# Patient Record
Sex: FEMALE | Employment: FULL TIME | URBAN - NONMETROPOLITAN AREA
[De-identification: names, ages, dates, MRNs, and addresses within clinical notes are randomized per-mention and may not be internally consistent; named-entity substitution may affect disease eponyms.]

---

## 2023-02-13 ENCOUNTER — OFFICE VISIT (OUTPATIENT)
Dept: FAMILY MEDICINE CLINIC | Age: 53
End: 2023-02-13

## 2023-02-13 VITALS
HEART RATE: 78 BPM | HEIGHT: 62 IN | OXYGEN SATURATION: 97 % | SYSTOLIC BLOOD PRESSURE: 122 MMHG | DIASTOLIC BLOOD PRESSURE: 70 MMHG | BODY MASS INDEX: 27.46 KG/M2 | TEMPERATURE: 97.4 F | RESPIRATION RATE: 10 BRPM | WEIGHT: 149.2 LBS

## 2023-02-13 DIAGNOSIS — R74.01 ALT (SGPT) LEVEL RAISED: ICD-10-CM

## 2023-02-13 DIAGNOSIS — R41.3 MEMORY DIFFICULTIES: ICD-10-CM

## 2023-02-13 DIAGNOSIS — E55.9 VITAMIN D DEFICIENCY, UNSPECIFIED: ICD-10-CM

## 2023-02-13 DIAGNOSIS — R35.1 NOCTURIA: ICD-10-CM

## 2023-02-13 DIAGNOSIS — E78.5 ELEVATED LIPIDS: ICD-10-CM

## 2023-02-13 DIAGNOSIS — Z71.89 ENCOUNTER FOR HERB AND VITAMIN SUPPLEMENT MANAGEMENT: Primary | ICD-10-CM

## 2023-02-13 DIAGNOSIS — E06.3 HASHIMOTO'S THYROIDITIS: ICD-10-CM

## 2023-02-13 DIAGNOSIS — M54.2 NECK PAIN: ICD-10-CM

## 2023-02-13 DIAGNOSIS — R74.8 LOW SERUM HDL: ICD-10-CM

## 2023-02-13 DIAGNOSIS — G47.9 SLEEP DIFFICULTIES: ICD-10-CM

## 2023-02-13 DIAGNOSIS — K59.01 SLOW TRANSIT CONSTIPATION: ICD-10-CM

## 2023-02-13 RX ORDER — MELOXICAM 15 MG/1
TABLET ORAL DAILY PRN
COMMUNITY
Start: 2022-12-03

## 2023-02-13 RX ORDER — LEVOTHYROXINE SODIUM 0.03 MG/1
TABLET ORAL
COMMUNITY
Start: 2022-07-07

## 2023-02-13 RX ORDER — ESCITALOPRAM OXALATE 10 MG/1
TABLET ORAL
COMMUNITY
Start: 2022-09-26

## 2023-02-13 SDOH — ECONOMIC STABILITY: FOOD INSECURITY: WITHIN THE PAST 12 MONTHS, YOU WORRIED THAT YOUR FOOD WOULD RUN OUT BEFORE YOU GOT MONEY TO BUY MORE.: NEVER TRUE

## 2023-02-13 SDOH — ECONOMIC STABILITY: INCOME INSECURITY: HOW HARD IS IT FOR YOU TO PAY FOR THE VERY BASICS LIKE FOOD, HOUSING, MEDICAL CARE, AND HEATING?: NOT HARD AT ALL

## 2023-02-13 SDOH — ECONOMIC STABILITY: FOOD INSECURITY: WITHIN THE PAST 12 MONTHS, THE FOOD YOU BOUGHT JUST DIDN'T LAST AND YOU DIDN'T HAVE MONEY TO GET MORE.: NEVER TRUE

## 2023-02-13 SDOH — ECONOMIC STABILITY: HOUSING INSECURITY
IN THE LAST 12 MONTHS, WAS THERE A TIME WHEN YOU DID NOT HAVE A STEADY PLACE TO SLEEP OR SLEPT IN A SHELTER (INCLUDING NOW)?: NO

## 2023-02-13 ASSESSMENT — PATIENT HEALTH QUESTIONNAIRE - PHQ9
SUM OF ALL RESPONSES TO PHQ QUESTIONS 1-9: 0
SUM OF ALL RESPONSES TO PHQ9 QUESTIONS 1 & 2: 0
SUM OF ALL RESPONSES TO PHQ QUESTIONS 1-9: 0
SUM OF ALL RESPONSES TO PHQ QUESTIONS 1-9: 0
2. FEELING DOWN, DEPRESSED OR HOPELESS: 0
SUM OF ALL RESPONSES TO PHQ QUESTIONS 1-9: 0
1. LITTLE INTEREST OR PLEASURE IN DOING THINGS: 0

## 2023-02-13 NOTE — PATIENT INSTRUCTIONS
Thank you   Thank you for trusting us with your healthcare needs. You may receive a survey regarding today's visit. It would help us out if you would take a few moments to provide your feedback. We value your input. Please bring in ALL medications BOTTLES, including inhalers, herbal supplements, over the counter, prescribed & non-prescribed medicine. The office would like actual medication bottles and a list.   Please note our OFFICE POLICIES:   Prior to getting your labs drawn, please check with your insurance company for benefits and eligibility of lab services. Often, insurance companies cover certain tests for preventative visits only. It is patient's responsibility to see what is covered. We are unable to change a diagnosis after the test has been performed. Lab orders will not be re-printed. Please hold onto your original lab orders and take them to your lab to be completed. If you no show your scheduled appointment three times, you will be dismissed from this practice. Reschedules must be completed 24 hours prior to your schedule appointment. If the list below has been completed, PLEASE FAX RECORDS TO OUR OFFICE @ [phone].  Once the records have been received we will update your records at our office:  Health Maintenance Due   Topic Date Due    Depression Screen  Never done    Cervical cancer screen  Never done    Colorectal Cancer Screen  Never done    Breast cancer screen  Never done    COVID-19 Vaccine (4 - Booster for Moderna series) 04/06/2022    Lipids  09/18/2022

## 2023-02-13 NOTE — PROGRESS NOTES
[address] [facility], [place] [address] [address]  Dept: [phone]  Dept Fax: [phone]  Loc: [phone]      Óscar Godoy is a 46 y.o. [place] [facility] female. Laura Marie  presents to the [place] [address] clinic today for   Chief Complaint   Patient presents with    New Patient     Wee protocol    Fatigue     Not sleeping well occasional at night    Other     Bilat feet pain, right side of neck and shoulder- occasional pain down to finger tips    Discuss Labs    Sleep Problem     Change 3 years ago, on thyroid    Foot Pain     Up the leg    Anxiety     Feelings changed before thyroid, dont want to go to social events,     Neck Pain     And shoulder, right upper CC joints,     Knee Pain     Right side feet knee, walks on toes   , and;   1. Encounter for herb and vitamin supplement management    2. ALT (SGPT) level raised    3. Hashimoto's thyroiditis    4. Elevated lipids    5. Vitamin D deficiency, unspecified    6. Low serum HDL    7. Neck pain    8. Sleep difficulties    9. Slow transit constipation    10. Nocturia    11. Memory difficulties          I have reviewed [facility] medical, surgical and other pertinent history in detail, and have updated medication and allergy information in the computerized patient record. Clinical Care Team:     -Referring Provider for today's consult: self  -Primary Care Provider: No primary care provider on file. Medical/Surgical History:   She  has no past medical history on file. Her  has no past surgical history on file. Family/Social History:     Her family history is not on file. She  reports that she has never smoked. She has never used smokeless tobacco. She reports that she does not drink alcohol and does not use drugs.     Medications/Allergies/Immunizations:     Her current medication(s) include   Current Outpatient Medications:     meloxicam (MOBIC) 15 MG tablet, daily as needed, Disp: , Rfl:     levothyroxine (SYNTHROID) 25 MCG tablet, Take by mouth, Disp: , Rfl:     escitalopram (LEXAPRO) 10 MG tablet, Take by mouth, Disp: , Rfl:   Allergies: Patient has no known allergies. Immunizations:   Immunization History   Administered Date(s) Administered    COVID-19, MODERNA BLUE border, Primary or Immunocompromised, (age 12y+), IM, 100 mcg/0.5mL 03/18/2021, 04/15/2021    COVID-19, MODERNA Booster BLUE border, (age 18y+), IM, 50mcg/0.25mL 02/09/2022    Influenza Virus Vaccine 09/24/2015    Influenza, AFLURIA (age 1 yrs+), FLUZONE, (age 10 mo+), MDV, 0.5mL 09/24/2015, 09/18/2017    Influenza, FLUARIX, FLULAVAL, FLUZONE (age 10 mo+) AND AFLURIA, (age 1 y+), PF, 0.5mL 10/03/2016, 09/26/2022    Pneumococcal Conjugate 13-valent ([id number]) 08/18/2021    Tdap (Boostrix, Adacel) 07/20/2018    Zoster Recombinant (Shingrix) 08/18/2021, 03/30/2022        History of Present Illness:     Clementdarvinsung's had concerns including New Patient (Wee protocol), Fatigue (Not sleeping well occasional at night), Other (Bilat feet pain, right side of neck and shoulder- occasional pain down to finger tips), Discuss Labs, Sleep Problem (Change 3 years ago, on thyroid), Foot Pain (Up the leg), Anxiety (Feelings changed before thyroid, dont want to go to social events, ), Neck Pain (And shoulder, right upper CC joints, ), and Knee Pain (Right side feet knee, walks on toes). Brittanie Bland  presents to the [address] [place] [address] today for;   Chief Complaint   Patient presents with    New Patient     Wee protocol    Fatigue     Not sleeping well occasional at night    Other     Bilat feet pain, right side of neck and shoulder- occasional pain down to finger tips    Discuss Labs    Sleep Problem     Change 3 years ago, on thyroid    Foot Pain     Up the leg    Anxiety     Feelings changed before thyroid, dont want to go to social events,     Neck Pain     And shoulder, right upper CC joints,     Knee Pain     Right side feet knee, walks on toes   , abnormal labs follow up and these conditions as she  Is looking today for:     1. Encounter for herb and vitamin supplement management    2. ALT (SGPT) level raised    3. Hashimoto's thyroiditis    4. Elevated lipids    5. Vitamin D deficiency, unspecified    6. Low serum HDL    7. Neck pain    8. Sleep difficulties    9. Slow transit constipation    10. Nocturia    11. Memory difficulties      HPI    Subjective:     Review of Systems   Musculoskeletal:  Positive for arthralgias, gait problem, joint swelling and neck pain. Psychiatric/Behavioral:  Positive for sleep disturbance. All other systems reviewed and are negative. Objective:     /70 (Site: Left Upper Arm, Position: Sitting, Cuff Size: Medium Adult)   Pulse 78   Temp 97.4 °F (36.3 °C) (Temporal)   Resp 10   Ht 5' 1.75\" (1.568 m)   Wt 149 lb 3.2 oz (67.7 kg)   SpO2 97%   BMI 27.51 kg/m²   Physical Exam  Vitals and nursing note reviewed. Constitutional:       Appearance: Normal appearance. HENT:      Head: Normocephalic. Pulmonary:      Effort: Pulmonary effort is normal.   Neurological:      Mental Status: She is alert. Psychiatric:         Mood and Affect: Mood normal.         Thought Content: Thought content normal.          Laboratory Data:   Lab results were searched in Care Everywhere and/or those brought by the pateint were reviewed today with Fredy Dinh and she has a copy of their most recent labs to take home with them as noted below;       Imaging Data:   Imaging Data:       Assessment & Plan:       Impression:  1. Encounter for herb and vitamin supplement management    2. ALT (SGPT) level raised    3. Hashimoto's thyroiditis    4. Elevated lipids    5. Vitamin D deficiency, unspecified    6. Low serum HDL    7. Neck pain    8. Sleep difficulties    9. Slow transit constipation    10. Nocturia    11.  Memory difficulties      Assessment and Plan:  After reviewing the patients chief complaints, reviewing their labfindings in great detail (with the patient and those accompanying them) which correlate to their chief complaints, symptoms, and or medical conditions; suggestions were made relating to changes in diet and or supplements which may improve the complaints and which will be reflected in their future lab findings; Chief Complaint   Patient presents with    New Patient     Wee protocol    Fatigue     Not sleeping well occasional at night    Other     Bilat feet pain, right side of neck and shoulder- occasional pain down to finger tips    Discuss Labs    Sleep Problem     Change 3 years ago, on thyroid    Foot Pain     Up the leg    Anxiety     Feelings changed before thyroid, dont want to go to social events,     Neck Pain     And shoulder, right upper CC joints,     Knee Pain     Right side feet knee, walks on toes   ;    Plans for the next visits:  - Abnormal and non-optimal Labs were ordered today to be repeated in the next 120-365 days to assess changes from adjustments in nutrition and or nutrients. - Patient instructed when having a blood draw to ask the  to divide their lab draws into multiple draws over several days if not feeling good at the time of the lab draw or if either prefers to do several smaller blood draws over several days  -Patient instructed to check with insurer before each lab draw and to go to the lab which the insurer directs them for the most cost effective lab draw with the least patient's cost  - Sixto De León  will be scheduled subsequent to those results. - Sixto De León will bring in her drink, food, supplement log to her next visit    Chronic Problems Addressed on this Visit:                                   1.  Intensity of Service; Uncontrolled items at this visit;    Chief Complaint   Patient presents with    New Patient     Wee protocol    Fatigue     Not sleeping well occasional at night    Other     Bilat feet pain, right side of neck and shoulder- occasional pain down to finger tips    Discuss Labs    Sleep Problem     Change 3 years ago, on thyroid    Foot Pain     Up the leg    Anxiety     Feelings changed before thyroid, dont want to go to social events,     Neck Pain     And shoulder, right upper CC joints,     Knee Pain     Right side feet knee, walks on toes   ; Improved items at this visit and Stable items were discussed at this visit;  2. Patients food, drinks, supplements and symptoms were reviewed with the patient,       - Lisa Torres will bring food, drink, supplements and symptoms log to next visit for inclusion in their record      - 75 better food list reviewed & given to patient with the omega 6 food list to avoid      - The 52 Latex foods list was reviewed and given to the patients with the information on carrageenan         - Gluten in corn and oats abstracts sheet reviewed and given to the patient today   3. Greater than 60 minutes time was spent with the patient face to face on this visit; of which >50% was for counseling and coordination of care, as well as the time spent before and after the visit reviewing the chart, documenting the encounter, reviewing labs,reports, NIH listed studies, making phone calls, etc.      Patients food and drinks were reviewed with the patient,   - They will bring a food drink symptom log to future visits for inclusion in their record    - 75 better food list reviewed & given to patient along with the omega 6 food list to avoid      - Gluten in corn and oats abstracts sheet reviewed and given to the patient today    - 23 Foods containing Latex-like proteins was reviewed and copy to be taken if desired     - Nutrient Supplements list provided and copyto be taken if desired    - [employer] web site offered to patient to review at their convenience by staff with login information    Note:  I have discussed with the patient that with all nutraceuticals, there is often mixed data and emerging research which needs to be monitored; as well as an array of NIH fact sheets on nutrients and supplements, available at www.nih,issue plus [employer]. Healthcare Bluebook plus www.Qustreeti,org. If I have recommended cinnamon at the request of this patient to assist them in control of their blood sugar, triglyceride, and/or weight issues. I discussed that the patient's clinical use of cinnamon bark, calcium, magnesium, Vitamin D, and pharmaceutical grade CVS omega 3 oil or triple-strength fish oil, and B-50/B-100 time-released B-complex by [id number] [facility] will be for a time-limited trial to determine their individual effectiveness and safety in this patient. I also referred the patient to the NMCD: Nutrition, Metabolism, and Cardiovascular Diseases (SecuritiesCard.pl) and concerns about long-term use and hepatotoxicity of cinnamon and other nutrients. I suggested they frequently search nih.gov for the latest non-proprietary information on nutriceuticals as well as consider a subscription to [employer] for details on reviewed supplements, or at the least review the nutrient files at [facility] at [facility], [address] bark, an insulin mimetic, reduces some High Carbohydrate Dietary Impacts. Methylhydroxychalcone polymers insulin-enhancing properties in fat cells are responsible for enhanced glucose uptake, inhibiting hepatic HMG-CoA reductase and lowers lipids. www.jacn. org/content/20/4/327.full     But cinnamon with additives such as Cinnamon Extract are not effective as insulin mimetics.  :eStoreDirectory.at     Nutrients for Start up from [employer] or [employer] for ease to get started now;  Abdullahi Fowler has some useable products;  - Triple Strength Fish Oil, enteric coated  - Vit D-3 5000 IU gel caps  - Iron ferrous sulfate 325 mg tabs  - Centrum Silver look-a-like for most patients, or  - Centrum plain look-a-like if need iron    Local pharmacies or chains such as [employer], [employer], have:  - CVS pharmaceutical grade omega 3 is 90% EPA/DHA whereas most Triple strength fish oil are 75% EPA/DHA  - Triple Strength Fish Oil (enteric coated if available) or if not enteric coated, can take from freezer for less burps  - B-50 or B-100 released balanced B complex tabs by [id number] [facility] bark 500 mg (without Chromium or extracts)   some brands list 1000 mg / serving of 2 capsules,    some brands have 1000 mg caps with the undesireable chromium extract  - Calcium carbonate/citrate, magnesium oxide/citrate, Vit D-3 as 3-4 tabs/caps/serving     Some Local Brands may contain Zinc which is acceptable for the first bottle or two  - Magnesium oxide 250 mg tabs for those having < 2 bowel movements daily  - Magnesium citrate 200 mg if having > 2 bowel movements/day  - Centrum Silver or look-a-like for most patients, Centrum plain or look-a-like with iron  - Vitamin D-3 comes as 1,000 IU or 2,000 IU or 5,000 IU gel caps or Liquid drops but keep Vitamin D levels <50 but >40     Some brands containing or derived from soy oil or corn oil are OK if not allergic to soy  - Elemental Iron 65 mg tabs at bedtime is available over the counter if need more iron     Usually turns bowel movements grey, green, or black but not a concern  - Apricot Kernel Oil (by Now) for dry skin sensitive perineal or perianal area skin    Nutrients for ongoing use by Mail order for less expense from Anywhere[employer]. [employer] ;  - Strength Fish Oil , 240 Softgels Item [id number]  -B-100 time released balanced B complex Item #[id number]  - Cinnamon bark 500 mg without Chromium or extract Item #[id number]  - Calcium carbonate 1000 mg, Magnesium oxide 500 mg, Vit D-3 400 IU Item #[id number]  - Magnesium oxide 500 mg tabs Item #[id number] if less than 2 bowel movements daily  - [employer] Seniors Item #[id number] for most patients, One Daily Item #603956 with iron  - Vit D 3  1,000 Item #[id number]      2,000 IU Item #[id number]   Item #[id number]     Some brands containing orderived from soy oil or corn oil are OK if not allergic to soy    Nutrients for Special Needs by Mail order for less expense from www. puritan.com;  -Elemental Iron 65 mg tabs Item #095892 if need more iron for low iron on labs    Usually turns bowel movements grey, green or black but not a concern  - Time released Niacin 250 mg Item #175517 for cold intolerance, low libido or impotence  - DHEA 50 mg Item #[id number] for improving DHEA levels on labs if having Fatigue    If stools too loose substitute for your Magnesium oxide using;   Magnesium citrate 200 mg tabs (NOT liquid) at [employer]   Magnesium gluconate 550 mg by Daniel Lau at Preo or Kähu. com  Magnesium chloride foot soaks or body sprays  www.Slicebooks   Magnesium chloride flakes 14.99 Item #: [id number] if back-ordered, get spray  Magnesium threonate, Magtein also helps mental clarity and sleep    Food Drink Symptom Log;  I asked this patient to track these items and any other symptoms on their list on a weekly basis to documenttheir progress or lack of same. This can be done on the symptom tracking sheet I gave them at today's visit but looks like this:                                                      Rate on scale of 0-10 with zero = not noticeable  Symptom:                            Week 1               2                 3                 4               Etc            Hair loss    Foot cramps    Paresthesia    Aches    IBS (irritable bowel)    Constipation    Diarrhea  Nocturia (up to bathroom at night)    Fatigue/Energy level  Stress      On the other side of the sheet they can track their food, drink, environment, activity, symptoms etc      Avoiding Latex-like proteins in my foods;     Avocados, Bananas, Celery, Figs & Kiwi proteins have latex-like proteins to inflame our immune systems, plus 47 more foods  How Can I Have A Latex Allergy? Eating foods with latex-like protein exposes us to latex allergies. Our body cannot tell the differencebetween these latex-like proteins and latex from rubber products since many people are allergic to fruit, vegetables and latex. Read labels on pre-packaged foods. This list to avoid is only a guide if you are known allergicto latex or have a latex rash on your chin, cheeks and lines on your neck and chest. The amount of latex is different in each food product or fruit variety. Avoid out of Season if not grown locally:   Melon, Nectarine, Papaya, Cherry, Passion fruit, Plum, Chestnuts, and Tomato. Avocado, Banana, Celery, Figs, and Kiwi always contain Latex-like protein. Whats in Season? Strawberries taste better in June than December because June is strawberry season so buy locally grown produce \"in season\" for the best flavor, cost, and less Latex. Locally grown produce not only tastes great but also requires little or no ethylene exposure in food distribution so has less latex content. Out of season: use canned, frozen, or dried since those are processed ripe and latex content is lower!!!     Month     [place] Locally Grown Produce  January, February, March: use canned, frozen or dried fruits since lower in latex  April: asparagus, radishes  May: asparagus, broccoli, green onions, greens, peas, radishes, rhubarb  Nathalia: asparagus, beets, beans, broccoli, cabbage, cantaloupe, carrots, green onions, greens, lettuce, onions, parsley, peas, radishes, rhubarb, strawberries, watermelons  July: beans, beets, blueberries, broccoli, cabbage, cantaloupe, carrots, cauliflower, celery, cucumbers, eggplant, grapes, green onions, greens, lettuce, onions, parsley, peas, peaches, bell peppers, potatoes, radishes, summer raspberries, squash, sweetcorn, tomatoes, turnips, watermelons  August: apples, beans, beets, blueberries, cabbage, cantaloupe, carrots, cauliflower, celery, cucumbers, eggplant, grapes, green onions, greens, lettuce, onions, parsley, peas, peaches, pears, bell peppers, potatoes, radishes, squash, sweet corn, tomatoes, turnips, watermelons  September: apples, beans, beets, blueberries, cabbage, cantaloupe, carrots, cauliflower, celery, cucumbers, eggplant, grapes, green onions, greens, lettuce, onions, parsley, peas, peaches, pears, bell peppers, plums, potatoes, pumpkins, radishes, fall red raspberries, squash, sweet corn, tomatoes, turnips, watermelons  October: apples, beets, broccoli, cabbage, carrots, cauliflower, celery, green onions, greens, lettuce, parsley, peas, pears, potatoes, pumpkins, radishes, fall red raspberries, squash, turnips  November: broccoli, cabbage, carrots, parsley, pears, peas  December: use canned, frozen or dried fruits since lower in latex    Upto half of latex-sensitive patients show allergic reactions to fruits (avocados, bananas, kiwifruits, papayas, peaches),   Annals of Allergy, 1994. These plants contain the same proteins that are allergens in latex. People with fruit allergies should warn physicians before undergoing procedures which may cause anaphylactic reaction if in contact with latex gloves. Some of the common foods with defined cross-reactivity to latex are avocado, banana, kiwi, chestnut, raw potato, tomato, stone fruits (e.g., peach, cherry), hazelnut, melons, celery, carrot, apple, pear, papaya, and almond. Foods with less well-defined cross-reactivity to latex are peanuts, peppers, citrus fruits, coconut, pineapple, kari, fig, passion fruit, Ugli fruit, and grape. This fruit/latex cross-reactivity is worsened by ethylene, a gas used to hasten commercial ripening. In nature, plants produce low levels of the hormone ethylene, which regulates germination, flowering, and ripening.  Forced ripening by high ethylene concentrations, plants produce allergenic wound-repair proteins, which are similar to wound-repair proteins made during the tapping of rubber trees. Sensitive individuals who ingest the fruit get a higher dose and worse reaction. Some people may even first become sensitized to latex through fruit. Can food processing increase the concentrations of allergenic proteins? Latex-sensitized children (and adults) in [place] often experience allergic reactions after eating bananas ripened artificially with ethylene. In the [facility], food distribution centers treat unripe bananas and other produce with ethylene to ripen; not commonly done in [facility] since fruit is tree-ripened there. Does treatment of food with ethylene induce banana proteins that cross-react with latex? (Shine et al.)    References:   Latex in Foods Allergy, http://ehp.niehs.nih.gov/members/2003/5811/5811.html    Search web for [employer] in Season \" for where you live or are at the time you food shop   Management of Latex, ://medicalcenter. osu.edu/  search for nih, latex-like proteins in foods

## 2023-06-28 ENCOUNTER — TELEMEDICINE (OUTPATIENT)
Dept: FAMILY MEDICINE CLINIC | Age: 53
End: 2023-06-28
Payer: MEDICAID

## 2023-06-28 DIAGNOSIS — M54.2 NECK PAIN: ICD-10-CM

## 2023-06-28 DIAGNOSIS — E06.3 HASHIMOTO'S THYROIDITIS: ICD-10-CM

## 2023-06-28 DIAGNOSIS — R74.8 LOW SERUM HDL: ICD-10-CM

## 2023-06-28 DIAGNOSIS — E55.9 VITAMIN D DEFICIENCY, UNSPECIFIED: ICD-10-CM

## 2023-06-28 DIAGNOSIS — G47.9 SLEEP DIFFICULTIES: ICD-10-CM

## 2023-06-28 DIAGNOSIS — Z71.89 ENCOUNTER FOR HERB AND VITAMIN SUPPLEMENT MANAGEMENT: ICD-10-CM

## 2023-06-28 DIAGNOSIS — E78.5 ELEVATED LIPIDS: ICD-10-CM

## 2023-06-28 DIAGNOSIS — K59.01 SLOW TRANSIT CONSTIPATION: ICD-10-CM

## 2023-06-28 DIAGNOSIS — R74.01 ALT (SGPT) LEVEL RAISED: Primary | ICD-10-CM

## 2023-06-28 DIAGNOSIS — R41.3 MEMORY DIFFICULTIES: ICD-10-CM

## 2023-06-28 DIAGNOSIS — R35.1 NOCTURIA: ICD-10-CM

## 2023-06-28 PROBLEM — M65.311 TRIGGER FINGER OF RIGHT THUMB: Status: ACTIVE | Noted: 2021-08-12

## 2023-06-28 PROBLEM — M25.512 CHRONIC PAIN OF BOTH SHOULDERS: Status: ACTIVE | Noted: 2019-12-12

## 2023-06-28 PROBLEM — E03.9 HYPOTHYROIDISM: Status: ACTIVE | Noted: 2022-04-14

## 2023-06-28 PROBLEM — M25.511 CHRONIC PAIN OF BOTH SHOULDERS: Status: ACTIVE | Noted: 2019-12-12

## 2023-06-28 PROBLEM — F33.41 RECURRENT MAJOR DEPRESSIVE DISORDER, IN PARTIAL REMISSION (HCC): Status: ACTIVE | Noted: 2021-08-18

## 2023-06-28 PROBLEM — M54.12 CERVICAL RADICULITIS: Status: ACTIVE | Noted: 2019-12-12

## 2023-06-28 PROBLEM — G89.29 CHRONIC PAIN OF BOTH SHOULDERS: Status: ACTIVE | Noted: 2019-12-12

## 2023-06-28 PROCEDURE — G8427 DOCREV CUR MEDS BY ELIG CLIN: HCPCS | Performed by: FAMILY MEDICINE

## 2023-06-28 PROCEDURE — 3017F COLORECTAL CA SCREEN DOC REV: CPT | Performed by: FAMILY MEDICINE

## 2023-06-28 PROCEDURE — 99215 OFFICE O/P EST HI 40 MIN: CPT | Performed by: FAMILY MEDICINE

## 2023-06-28 RX ORDER — CHLORAL HYDRATE 500 MG
1 CAPSULE ORAL
COMMUNITY

## 2023-08-14 ENCOUNTER — TELEPHONE (OUTPATIENT)
Dept: FAMILY MEDICINE CLINIC | Age: 53
End: 2023-08-14

## 2023-08-14 NOTE — TELEPHONE ENCOUNTER
Patient called to see when her next appt is. She is currently not scheduled for an appt. Last OV note says return in 3 mo around 09/28/2023. Patient said there are 2 people wanting to become new pts and they would all like to come together on a Monday.  Please advise

## 2023-08-22 NOTE — TELEPHONE ENCOUNTER
Called and lm. Advised we can get her in to see Dr. Gorge Altamirano, but there are no slots available for a new pt until 2021 in March or April. Please call and make a follow up appointment for yourself.

## 2023-10-16 ENCOUNTER — TELEPHONE (OUTPATIENT)
Dept: FAMILY MEDICINE CLINIC | Age: 53
End: 2023-10-16

## 2023-11-01 ENCOUNTER — TELEMEDICINE (OUTPATIENT)
Dept: FAMILY MEDICINE CLINIC | Age: 53
End: 2023-11-01

## 2023-11-01 DIAGNOSIS — G47.9 SLEEP DIFFICULTIES: ICD-10-CM

## 2023-11-01 DIAGNOSIS — R74.8 LOW SERUM HDL: ICD-10-CM

## 2023-11-01 DIAGNOSIS — E55.9 VITAMIN D DEFICIENCY, UNSPECIFIED: ICD-10-CM

## 2023-11-01 DIAGNOSIS — E06.3 HASHIMOTO'S THYROIDITIS: ICD-10-CM

## 2023-11-01 DIAGNOSIS — R41.3 MEMORY DIFFICULTIES: ICD-10-CM

## 2023-11-01 DIAGNOSIS — E78.5 ELEVATED LIPIDS: ICD-10-CM

## 2023-11-01 DIAGNOSIS — K59.01 SLOW TRANSIT CONSTIPATION: ICD-10-CM

## 2023-11-01 DIAGNOSIS — Z71.89 ENCOUNTER FOR HERB AND VITAMIN SUPPLEMENT MANAGEMENT: ICD-10-CM

## 2023-11-01 DIAGNOSIS — M54.2 NECK PAIN: ICD-10-CM

## 2023-11-01 DIAGNOSIS — R35.1 NOCTURIA: ICD-10-CM

## 2023-11-01 DIAGNOSIS — R74.01 ALT (SGPT) LEVEL RAISED: ICD-10-CM

## 2023-11-01 RX ORDER — MAGNESIUM CHLORIDE 71.5 G/G
1 TABLET ORAL
COMMUNITY

## 2023-11-01 NOTE — PROGRESS NOTES
procedures which may cause anaphylactic reaction if in contact with latex gloves. Some of the common foods with defined cross-reactivity to latex are avocado, banana, kiwi, chestnut, raw potato, tomato, stone fruits (e.g., peach, cherry), hazelnut, melons, celery, carrot, apple, pear, papaya, and almond. Foods with less well-defined cross-reactivity to latex are peanuts, peppers, citrus fruits, coconut, pineapple, kari, fig, passion fruit, Ugli fruit, and grape. This fruit/latex cross-reactivity is worsened by ethylene, a gas used to hasten commercial ripening. In nature, plants produce low levels of the hormone ethylene, which regulates germination, flowering, and ripening. Forced ripening by high ethylene concentrations, plants produce allergenic wound-repair proteins, which are similar to wound-repair proteins made during the tapping of rubber trees. Sensitive individuals who ingest the fruit get a higher dose and worse reaction. Some people may even first become sensitized to latex through fruit. Can food processing increase the concentrations of allergenic proteins? Latex-sensitized children (and adults) in [facility] often experience allergic reactions after eating bananas ripened artificially with ethylene. In the [facility], food distribution centers treat unripe bananas and other produce with ethylene to ripen; not commonly done in [facility] since fruit is tree-ripened there. Does treatment of food with ethylene induce banana proteins that cross-react with latex? (Shine et al.)    References:   Latex in Foods Allergy, http://ehp.niehs.nih.gov/members/2003/5811/5811.html    Search web for [employer] in Season \" for where you live or are at the time you food shop   Management of Latex, ://[email or web address]/  search for nih, latex-like proteins in foods

## 2023-11-30 ENCOUNTER — OFFICE VISIT (OUTPATIENT)
Dept: FAMILY MEDICINE CLINIC | Age: 53
End: 2023-11-30
Payer: MEDICAID

## 2023-11-30 VITALS
HEART RATE: 56 BPM | TEMPERATURE: 97.9 F | SYSTOLIC BLOOD PRESSURE: 116 MMHG | OXYGEN SATURATION: 97 % | WEIGHT: 145.6 LBS | DIASTOLIC BLOOD PRESSURE: 64 MMHG | BODY MASS INDEX: 26.79 KG/M2 | RESPIRATION RATE: 10 BRPM | HEIGHT: 62 IN

## 2023-11-30 DIAGNOSIS — Z71.89 ENCOUNTER FOR HERB AND VITAMIN SUPPLEMENT MANAGEMENT: ICD-10-CM

## 2023-11-30 DIAGNOSIS — G47.9 SLEEP DIFFICULTIES: ICD-10-CM

## 2023-11-30 DIAGNOSIS — R74.01 ALT (SGPT) LEVEL RAISED: Primary | ICD-10-CM

## 2023-11-30 DIAGNOSIS — R41.3 MEMORY DIFFICULTIES: ICD-10-CM

## 2023-11-30 DIAGNOSIS — K59.01 SLOW TRANSIT CONSTIPATION: ICD-10-CM

## 2023-11-30 DIAGNOSIS — E78.5 ELEVATED LIPIDS: ICD-10-CM

## 2023-11-30 DIAGNOSIS — R74.8 LOW SERUM HDL: ICD-10-CM

## 2023-11-30 DIAGNOSIS — E55.9 VITAMIN D DEFICIENCY, UNSPECIFIED: ICD-10-CM

## 2023-11-30 DIAGNOSIS — E06.3 HASHIMOTO'S THYROIDITIS: ICD-10-CM

## 2023-11-30 DIAGNOSIS — R35.1 NOCTURIA: ICD-10-CM

## 2023-11-30 DIAGNOSIS — M54.2 NECK PAIN: ICD-10-CM

## 2023-11-30 PROCEDURE — 1036F TOBACCO NON-USER: CPT | Performed by: FAMILY MEDICINE

## 2023-11-30 PROCEDURE — G8427 DOCREV CUR MEDS BY ELIG CLIN: HCPCS | Performed by: FAMILY MEDICINE

## 2023-11-30 PROCEDURE — G8484 FLU IMMUNIZE NO ADMIN: HCPCS | Performed by: FAMILY MEDICINE

## 2023-11-30 PROCEDURE — 99215 OFFICE O/P EST HI 40 MIN: CPT | Performed by: FAMILY MEDICINE

## 2023-11-30 PROCEDURE — 3017F COLORECTAL CA SCREEN DOC REV: CPT | Performed by: FAMILY MEDICINE

## 2023-11-30 PROCEDURE — G8419 CALC BMI OUT NRM PARAM NOF/U: HCPCS | Performed by: FAMILY MEDICINE

## 2023-11-30 RX ORDER — LEVOTHYROXINE SODIUM 0.05 MG/1
1 TABLET ORAL DAILY
COMMUNITY
Start: 2023-09-23

## 2023-11-30 NOTE — PROGRESS NOTES
(e.g., peach, cherry), hazelnut, melons, celery, carrot, apple, pear, papaya, and almond. Foods with less well-defined cross-reactivity to latex are peanuts, peppers, citrus fruits, coconut, pineapple, kari, fig, passion fruit, Ugli fruit, and grape. This fruit/latex cross-reactivity is worsened by ethylene, a gas used to hasten commercial ripening. In nature, plants produce low levels of the hormone ethylene, which regulates germination, flowering, and ripening. Forced ripening by high ethylene concentrations, plants produce allergenic wound-repair proteins, which are similar to wound-repair proteins made during the tapping of rubber trees. Sensitive individuals who ingest the fruit get a higher dose and worse reaction. Some people may even first become sensitized to latex through fruit. Can food processing increase the concentrations of allergenic proteins? Latex-sensitized children (and adults) in [facility] often experience allergic reactions after eating bananas ripened artificially with ethylene. In the [facility], food distribution centers treat unripe bananas and other produce with ethylene to ripen; not commonly done in [facility] since fruit is tree-ripened there. Does treatment of food with ethylene induce banana proteins that cross-react with latex? (Shine et al.)    References:   Latex in Foods Allergy, http://ehp.niehs.nih.gov/members/2003/5811/5811.html    Search web for [employer] in Season \" for where you live or are at the time you food shop   Management of Latex, ://[email or web address]/  search for nih, latex-like proteins in foods

## 2024-02-06 ENCOUNTER — TELEPHONE (OUTPATIENT)
Dept: FAMILY MEDICINE CLINIC | Age: 54
End: 2024-02-06

## 2024-02-28 ENCOUNTER — TELEMEDICINE (OUTPATIENT)
Dept: FAMILY MEDICINE CLINIC | Age: 54
End: 2024-02-28
Payer: MEDICAID

## 2024-02-28 DIAGNOSIS — R35.1 NOCTURIA: ICD-10-CM

## 2024-02-28 DIAGNOSIS — K59.01 SLOW TRANSIT CONSTIPATION: ICD-10-CM

## 2024-02-28 DIAGNOSIS — E78.5 ELEVATED LIPIDS: ICD-10-CM

## 2024-02-28 DIAGNOSIS — R74.01 ALT (SGPT) LEVEL RAISED: Primary | ICD-10-CM

## 2024-02-28 DIAGNOSIS — R41.3 MEMORY DIFFICULTIES: ICD-10-CM

## 2024-02-28 DIAGNOSIS — Z71.89 ENCOUNTER FOR HERB AND VITAMIN SUPPLEMENT MANAGEMENT: ICD-10-CM

## 2024-02-28 DIAGNOSIS — E55.9 VITAMIN D DEFICIENCY, UNSPECIFIED: ICD-10-CM

## 2024-02-28 DIAGNOSIS — R74.8 LOW SERUM HDL: ICD-10-CM

## 2024-02-28 DIAGNOSIS — G47.9 SLEEP DIFFICULTIES: ICD-10-CM

## 2024-02-28 DIAGNOSIS — M54.2 NECK PAIN: ICD-10-CM

## 2024-02-28 DIAGNOSIS — E06.3 HASHIMOTO'S THYROIDITIS: ICD-10-CM

## 2024-02-28 PROCEDURE — G8427 DOCREV CUR MEDS BY ELIG CLIN: HCPCS | Performed by: FAMILY MEDICINE

## 2024-02-28 PROCEDURE — 99215 OFFICE O/P EST HI 40 MIN: CPT | Performed by: FAMILY MEDICINE

## 2024-02-28 PROCEDURE — 3017F COLORECTAL CA SCREEN DOC REV: CPT | Performed by: FAMILY MEDICINE

## 2024-02-28 NOTE — PROGRESS NOTES
nutrients and supplements, available at www.nih,issue plus [employer].com plus [email or web address].     If I have recommended cinnamon at the request of this patient to assist them in control of their blood sugar, triglyceride, and/or weight issues.  I discussed that the patient's clinical use of cinnamon bark, calcium, magnesium, Vitamin D, and pharmaceutical grade [employer] omega 3 oil or triple-strength fish oil, and B-50/B-100 time-released B-complex by [place] will be for a time-limited trial to determine their individual effectiveness and safety in this patient.  I also referred the patient to the NMCD: Nutrition, Metabolism, and Cardiovascular Diseases (https://www.nmcd-journal.com/) and concerns about long-term use and hepatotoxicity of cinnamon and other nutrients.  I suggested they frequently search nih.gov for the latest non-proprietary information on nutriceuticals as well as consider a subscription to [employer] for details on reviewed supplements, or at the least review the nutrient files at [facility], [email or web address]     Cinnamon bark, an insulin mimetic, reduces some High Carbohydrate Dietary Impacts.  Methylhydroxychalcone polymer’s insulin-enhancing properties in fat cells are responsible for enhanced glucose uptake, inhibiting hepatic HMG-CoA reductase and lowers lipids. www.jacn.org/content/20/4/327.full     But cinnamon with additives such as Cinnamon Extract are not effective as insulin mimetics. ://www.ars.usda.gov/is/AR/archive/apr04/dewgdl3862.htm     Nutrients for Start up from Local pharmacy or grocery for ease to get started now;  [employer] has some useable products;  - Triple Strength Fish Oil, enteric coated  - Vit D-3 5000 IU gel caps  - Iron ferrous sulfate 325 mg tabs  - Centrum Silver look-a-like for most patients, or  - Centrum plain look-a-like if need iron    Local pharmacies or chains such as "[employer] have:  - Ali